# Patient Record
Sex: MALE | Race: WHITE | Employment: FULL TIME | URBAN - NONMETROPOLITAN AREA
[De-identification: names, ages, dates, MRNs, and addresses within clinical notes are randomized per-mention and may not be internally consistent; named-entity substitution may affect disease eponyms.]

---

## 2017-12-29 ENCOUNTER — HOSPITAL ENCOUNTER (OUTPATIENT)
Age: 62
Discharge: HOME | End: 2017-12-29
Payer: COMMERCIAL

## 2017-12-29 ENCOUNTER — HOSPITAL ENCOUNTER (OUTPATIENT)
Dept: GENERAL RADIOLOGY | Age: 62
Discharge: HOME | End: 2017-12-29
Payer: COMMERCIAL

## 2017-12-29 DIAGNOSIS — R52 PAIN: ICD-10-CM

## 2017-12-29 PROCEDURE — 73030 X-RAY EXAM OF SHOULDER: CPT

## 2018-03-10 ENCOUNTER — HOSPITAL ENCOUNTER (INPATIENT)
Age: 63
LOS: 3 days | Discharge: HOME | DRG: 311 | End: 2018-03-13
Attending: INTERNAL MEDICINE | Admitting: INTERNAL MEDICINE
Payer: COMMERCIAL

## 2018-03-10 ENCOUNTER — APPOINTMENT (OUTPATIENT)
Dept: CT IMAGING | Age: 63
End: 2018-03-10
Payer: COMMERCIAL

## 2018-03-10 ENCOUNTER — APPOINTMENT (OUTPATIENT)
Dept: GENERAL RADIOLOGY | Age: 63
End: 2018-03-10
Payer: COMMERCIAL

## 2018-03-10 ENCOUNTER — HOSPITAL ENCOUNTER (EMERGENCY)
Age: 63
Discharge: ANOTHER HOSPITAL | End: 2018-03-10
Attending: EMERGENCY MEDICINE
Payer: COMMERCIAL

## 2018-03-10 VITALS
OXYGEN SATURATION: 96 % | WEIGHT: 204 LBS | RESPIRATION RATE: 20 BRPM | HEART RATE: 102 BPM | DIASTOLIC BLOOD PRESSURE: 68 MMHG | SYSTOLIC BLOOD PRESSURE: 115 MMHG

## 2018-03-10 DIAGNOSIS — I24.9 ACUTE CORONARY SYNDROME (HCC): Primary | ICD-10-CM

## 2018-03-10 PROBLEM — E27.8 ADRENAL NODULE (HCC): Status: ACTIVE | Noted: 2018-03-10

## 2018-03-10 PROBLEM — E11.9 TYPE II DIABETES MELLITUS, WELL CONTROLLED (HCC): Status: ACTIVE | Noted: 2018-03-10

## 2018-03-10 PROBLEM — E04.1 THYROID NODULE: Status: ACTIVE | Noted: 2018-03-10

## 2018-03-10 PROBLEM — I10 ESSENTIAL HYPERTENSION: Status: ACTIVE | Noted: 2018-03-10

## 2018-03-10 PROBLEM — Z87.891 HISTORY OF TOBACCO ABUSE: Status: ACTIVE | Noted: 2018-03-10

## 2018-03-10 PROBLEM — I20.0 UNSTABLE ANGINA (HCC): Status: ACTIVE | Noted: 2018-03-10

## 2018-03-10 LAB
ABSOLUTE EOS #: 0.22 K/UL (ref 0–0.44)
ABSOLUTE IMMATURE GRANULOCYTE: 0.09 K/UL (ref 0–0.3)
ABSOLUTE LYMPH #: 2.7 K/UL (ref 1.1–3.7)
ABSOLUTE MONO #: 0.89 K/UL (ref 0.1–1.2)
ALBUMIN SERPL-MCNC: 4.1 G/DL (ref 3.5–5.2)
ALBUMIN SERPL-MCNC: 4.3 G/DL (ref 3.5–5.2)
ALBUMIN/GLOBULIN RATIO: 1.2 (ref 1–2.5)
ALBUMIN/GLOBULIN RATIO: 1.4 (ref 1–2.5)
ALP BLD-CCNC: 116 U/L (ref 40–129)
ALP BLD-CCNC: 116 U/L (ref 40–129)
ALT SERPL-CCNC: 21 U/L (ref 5–41)
ALT SERPL-CCNC: 24 U/L (ref 5–41)
ANION GAP SERPL CALCULATED.3IONS-SCNC: 20 MMOL/L (ref 9–17)
AST SERPL-CCNC: 17 U/L
AST SERPL-CCNC: 17 U/L
BASOPHILS # BLD: 1 % (ref 0–2)
BASOPHILS ABSOLUTE: 0.07 K/UL (ref 0–0.2)
BETA-HYDROXYBUTYRATE: 0.75 MMOL/L (ref 0.02–0.27)
BILIRUB SERPL-MCNC: 0.82 MG/DL (ref 0.3–1.2)
BILIRUB SERPL-MCNC: 0.86 MG/DL (ref 0.3–1.2)
BILIRUBIN DIRECT: 0.22 MG/DL
BILIRUBIN, INDIRECT: 0.6 MG/DL (ref 0–1)
BUN BLDV-MCNC: 16 MG/DL (ref 8–23)
BUN/CREAT BLD: 22 (ref 9–20)
CALCIUM SERPL-MCNC: 9.5 MG/DL (ref 8.6–10.4)
CHLORIDE BLD-SCNC: 91 MMOL/L (ref 98–107)
CK MB: 1.8 NG/ML
CO2: 22 MMOL/L (ref 20–31)
CREAT SERPL-MCNC: 0.74 MG/DL (ref 0.7–1.2)
DIFFERENTIAL TYPE: ABNORMAL
EKG ATRIAL RATE: 92 BPM
EKG ATRIAL RATE: 94 BPM
EKG P AXIS: 36 DEGREES
EKG P AXIS: 44 DEGREES
EKG P-R INTERVAL: 166 MS
EKG P-R INTERVAL: 172 MS
EKG Q-T INTERVAL: 366 MS
EKG Q-T INTERVAL: 382 MS
EKG QRS DURATION: 132 MS
EKG QRS DURATION: 134 MS
EKG QTC CALCULATION (BAZETT): 457 MS
EKG QTC CALCULATION (BAZETT): 472 MS
EKG R AXIS: -12 DEGREES
EKG R AXIS: -31 DEGREES
EKG T AXIS: -20 DEGREES
EKG T AXIS: 30 DEGREES
EKG VENTRICULAR RATE: 92 BPM
EKG VENTRICULAR RATE: 94 BPM
EOSINOPHILS RELATIVE PERCENT: 2 % (ref 1–4)
GFR AFRICAN AMERICAN: >60 ML/MIN
GFR NON-AFRICAN AMERICAN: >60 ML/MIN
GFR SERPL CREATININE-BSD FRML MDRD: ABNORMAL ML/MIN/{1.73_M2}
GFR SERPL CREATININE-BSD FRML MDRD: ABNORMAL ML/MIN/{1.73_M2}
GLOBULIN: NORMAL G/DL (ref 1.5–3.8)
GLUCOSE BLD-MCNC: 292 MG/DL (ref 70–99)
HCT VFR BLD CALC: 49.7 % (ref 40.7–50.3)
HEMOGLOBIN: 16.8 G/DL (ref 13–17)
IMMATURE GRANULOCYTES: 1 %
INR BLD: 1 (ref 0.9–1.2)
LACTIC ACID, WHOLE BLOOD: ABNORMAL MMOL/L (ref 0.7–2.1)
LACTIC ACID: 3.6 MMOL/L (ref 0.5–2.2)
LIPASE: 28 U/L (ref 13–60)
LYMPHOCYTES # BLD: 20 % (ref 24–43)
MCH RBC QN AUTO: 28.9 PG (ref 25.2–33.5)
MCHC RBC AUTO-ENTMCNC: 33.8 G/DL (ref 28.4–34.8)
MCV RBC AUTO: 85.5 FL (ref 82.6–102.9)
MONOCYTES # BLD: 7 % (ref 3–12)
NRBC AUTOMATED: 0 PER 100 WBC
PARTIAL THROMBOPLASTIN TIME: 25.5 SEC (ref 23.2–34.4)
PDW BLD-RTO: 12.2 % (ref 11.8–14.4)
PLATELET # BLD: 404 K/UL (ref 138–453)
PLATELET ESTIMATE: ABNORMAL
PMV BLD AUTO: 9.1 FL (ref 8.1–13.5)
POTASSIUM SERPL-SCNC: 4.3 MMOL/L (ref 3.7–5.3)
PROTHROMBIN TIME: 10.9 SEC (ref 9.7–12.2)
RBC # BLD: 5.81 M/UL (ref 4.21–5.77)
RBC # BLD: ABNORMAL 10*6/UL
SEG NEUTROPHILS: 71 % (ref 36–65)
SEGMENTED NEUTROPHILS ABSOLUTE COUNT: 9.46 K/UL (ref 1.5–8.1)
SODIUM BLD-SCNC: 133 MMOL/L (ref 135–144)
TOTAL PROTEIN: 7.4 G/DL (ref 6.4–8.3)
TOTAL PROTEIN: 7.5 G/DL (ref 6.4–8.3)
TROPONIN INTERP: NORMAL
TROPONIN T: <0.03 NG/ML
WBC # BLD: 13.4 K/UL (ref 3.5–11.3)
WBC # BLD: ABNORMAL 10*3/UL

## 2018-03-10 PROCEDURE — 99222 1ST HOSP IP/OBS MODERATE 55: CPT | Performed by: INTERNAL MEDICINE

## 2018-03-10 PROCEDURE — 96375 TX/PRO/DX INJ NEW DRUG ADDON: CPT

## 2018-03-10 PROCEDURE — 71045 X-RAY EXAM CHEST 1 VIEW: CPT

## 2018-03-10 PROCEDURE — 85610 PROTHROMBIN TIME: CPT

## 2018-03-10 PROCEDURE — 80053 COMPREHEN METABOLIC PANEL: CPT

## 2018-03-10 PROCEDURE — 99285 EMERGENCY DEPT VISIT HI MDM: CPT

## 2018-03-10 PROCEDURE — 6360000004 HC RX CONTRAST MEDICATION: Performed by: EMERGENCY MEDICINE

## 2018-03-10 PROCEDURE — 2580000003 HC RX 258: Performed by: EMERGENCY MEDICINE

## 2018-03-10 PROCEDURE — 84484 ASSAY OF TROPONIN QUANT: CPT

## 2018-03-10 PROCEDURE — 96374 THER/PROPH/DIAG INJ IV PUSH: CPT

## 2018-03-10 PROCEDURE — 71275 CT ANGIOGRAPHY CHEST: CPT

## 2018-03-10 PROCEDURE — 93005 ELECTROCARDIOGRAM TRACING: CPT

## 2018-03-10 PROCEDURE — 85025 COMPLETE CBC W/AUTO DIFF WBC: CPT

## 2018-03-10 PROCEDURE — 6360000002 HC RX W HCPCS: Performed by: EMERGENCY MEDICINE

## 2018-03-10 PROCEDURE — 2580000003 HC RX 258: Performed by: INTERNAL MEDICINE

## 2018-03-10 PROCEDURE — 6370000000 HC RX 637 (ALT 250 FOR IP): Performed by: EMERGENCY MEDICINE

## 2018-03-10 PROCEDURE — 36415 COLL VENOUS BLD VENIPUNCTURE: CPT

## 2018-03-10 PROCEDURE — 2060000000 HC ICU INTERMEDIATE R&B

## 2018-03-10 PROCEDURE — 74175 CTA ABDOMEN W/CONTRAST: CPT

## 2018-03-10 PROCEDURE — 83690 ASSAY OF LIPASE: CPT

## 2018-03-10 PROCEDURE — 82010 KETONE BODYS QUAN: CPT

## 2018-03-10 PROCEDURE — 6360000002 HC RX W HCPCS: Performed by: INTERNAL MEDICINE

## 2018-03-10 PROCEDURE — 6370000000 HC RX 637 (ALT 250 FOR IP): Performed by: INTERNAL MEDICINE

## 2018-03-10 PROCEDURE — 96372 THER/PROPH/DIAG INJ SC/IM: CPT

## 2018-03-10 PROCEDURE — 82553 CREATINE MB FRACTION: CPT

## 2018-03-10 PROCEDURE — 80076 HEPATIC FUNCTION PANEL: CPT

## 2018-03-10 PROCEDURE — 83605 ASSAY OF LACTIC ACID: CPT

## 2018-03-10 PROCEDURE — 85730 THROMBOPLASTIN TIME PARTIAL: CPT

## 2018-03-10 RX ORDER — TAMSULOSIN HYDROCHLORIDE 0.4 MG/1
0.4 CAPSULE ORAL DAILY
COMMUNITY

## 2018-03-10 RX ORDER — TAMSULOSIN HYDROCHLORIDE 0.4 MG/1
0.4 CAPSULE ORAL DAILY
Status: DISCONTINUED | OUTPATIENT
Start: 2018-03-10 | End: 2018-03-13 | Stop reason: HOSPADM

## 2018-03-10 RX ORDER — ASPIRIN 81 MG/1
324 TABLET, CHEWABLE ORAL ONCE
Status: DISCONTINUED | OUTPATIENT
Start: 2018-03-10 | End: 2018-03-10 | Stop reason: HOSPADM

## 2018-03-10 RX ORDER — SODIUM CHLORIDE 0.9 % (FLUSH) 0.9 %
10 SYRINGE (ML) INJECTION PRN
Status: DISCONTINUED | OUTPATIENT
Start: 2018-03-10 | End: 2018-03-13 | Stop reason: HOSPADM

## 2018-03-10 RX ORDER — GLIMEPIRIDE 2 MG/1
4 TABLET ORAL
Status: DISCONTINUED | OUTPATIENT
Start: 2018-03-11 | End: 2018-03-13 | Stop reason: HOSPADM

## 2018-03-10 RX ORDER — GLIMEPIRIDE 4 MG/1
4 TABLET ORAL
COMMUNITY

## 2018-03-10 RX ORDER — LISINOPRIL 10 MG/1
20 TABLET ORAL DAILY
COMMUNITY

## 2018-03-10 RX ORDER — NITROGLYCERIN 0.4 MG/1
0.4 TABLET SUBLINGUAL EVERY 5 MIN PRN
Status: DISCONTINUED | OUTPATIENT
Start: 2018-03-10 | End: 2018-03-10 | Stop reason: HOSPADM

## 2018-03-10 RX ORDER — ONDANSETRON 2 MG/ML
4 INJECTION INTRAMUSCULAR; INTRAVENOUS EVERY 6 HOURS PRN
Status: DISCONTINUED | OUTPATIENT
Start: 2018-03-10 | End: 2018-03-13 | Stop reason: HOSPADM

## 2018-03-10 RX ORDER — ACETAMINOPHEN 325 MG/1
650 TABLET ORAL EVERY 4 HOURS PRN
Status: DISCONTINUED | OUTPATIENT
Start: 2018-03-10 | End: 2018-03-13 | Stop reason: HOSPADM

## 2018-03-10 RX ORDER — FENTANYL CITRATE 50 UG/ML
50 INJECTION, SOLUTION INTRAMUSCULAR; INTRAVENOUS
Status: DISCONTINUED | OUTPATIENT
Start: 2018-03-10 | End: 2018-03-10 | Stop reason: HOSPADM

## 2018-03-10 RX ORDER — ASPIRIN 81 MG/1
81 TABLET, CHEWABLE ORAL DAILY
Status: DISCONTINUED | OUTPATIENT
Start: 2018-03-11 | End: 2018-03-13 | Stop reason: HOSPADM

## 2018-03-10 RX ORDER — 0.9 % SODIUM CHLORIDE 0.9 %
1000 INTRAVENOUS SOLUTION INTRAVENOUS ONCE
Status: COMPLETED | OUTPATIENT
Start: 2018-03-10 | End: 2018-03-10

## 2018-03-10 RX ORDER — SODIUM CHLORIDE 0.9 % (FLUSH) 0.9 %
10 SYRINGE (ML) INJECTION EVERY 12 HOURS SCHEDULED
Status: DISCONTINUED | OUTPATIENT
Start: 2018-03-10 | End: 2018-03-13 | Stop reason: HOSPADM

## 2018-03-10 RX ORDER — ESOMEPRAZOLE MAGNESIUM 20 MG/1
20 FOR SUSPENSION ORAL DAILY
COMMUNITY

## 2018-03-10 RX ORDER — ATORVASTATIN CALCIUM 20 MG/1
20 TABLET, FILM COATED ORAL NIGHTLY
Status: DISCONTINUED | OUTPATIENT
Start: 2018-03-10 | End: 2018-03-13 | Stop reason: HOSPADM

## 2018-03-10 RX ORDER — MORPHINE SULFATE 2 MG/ML
2 INJECTION, SOLUTION INTRAMUSCULAR; INTRAVENOUS
Status: DISCONTINUED | OUTPATIENT
Start: 2018-03-10 | End: 2018-03-13 | Stop reason: HOSPADM

## 2018-03-10 RX ORDER — ONDANSETRON 2 MG/ML
4 INJECTION INTRAMUSCULAR; INTRAVENOUS EVERY 30 MIN PRN
Status: DISCONTINUED | OUTPATIENT
Start: 2018-03-10 | End: 2018-03-10 | Stop reason: HOSPADM

## 2018-03-10 RX ORDER — HYDROCODONE BITARTRATE AND ACETAMINOPHEN 10; 325 MG/1; MG/1
1 TABLET ORAL EVERY 6 HOURS PRN
COMMUNITY

## 2018-03-10 RX ADMIN — ENOXAPARIN SODIUM 90 MG: 100 INJECTION SUBCUTANEOUS at 07:00

## 2018-03-10 RX ADMIN — Medication 10 ML: at 20:29

## 2018-03-10 RX ADMIN — IOPAMIDOL 100 ML: 612 INJECTION, SOLUTION INTRAVENOUS at 06:18

## 2018-03-10 RX ADMIN — MORPHINE SULFATE 2 MG: 2 INJECTION, SOLUTION INTRAMUSCULAR; INTRAVENOUS at 18:46

## 2018-03-10 RX ADMIN — ATORVASTATIN CALCIUM 20 MG: 20 TABLET, FILM COATED ORAL at 20:29

## 2018-03-10 RX ADMIN — ENOXAPARIN SODIUM 90 MG: 100 INJECTION SUBCUTANEOUS at 20:28

## 2018-03-10 RX ADMIN — ONDANSETRON 4 MG: 2 INJECTION INTRAMUSCULAR; INTRAVENOUS at 05:08

## 2018-03-10 RX ADMIN — FENTANYL CITRATE 50 MCG: 50 INJECTION, SOLUTION INTRAMUSCULAR; INTRAVENOUS at 05:09

## 2018-03-10 RX ADMIN — TAMSULOSIN HYDROCHLORIDE 0.4 MG: 0.4 CAPSULE ORAL at 16:04

## 2018-03-10 RX ADMIN — SODIUM CHLORIDE 1000 ML: 9 INJECTION, SOLUTION INTRAVENOUS at 05:16

## 2018-03-10 RX ADMIN — NITROGLYCERIN 0.4 MG: 0.4 TABLET SUBLINGUAL at 05:08

## 2018-03-10 ASSESSMENT — ENCOUNTER SYMPTOMS
NAUSEA: 1
FACIAL SWELLING: 0
COUGH: 0
SHORTNESS OF BREATH: 1
ABDOMINAL PAIN: 0
BACK PAIN: 0
VOMITING: 1
DIARRHEA: 0

## 2018-03-10 ASSESSMENT — PAIN SCALES - GENERAL
PAINLEVEL_OUTOF10: 3
PAINLEVEL_OUTOF10: 5
PAINLEVEL_OUTOF10: 2

## 2018-03-10 ASSESSMENT — PAIN DESCRIPTION - PAIN TYPE: TYPE: ACUTE PAIN

## 2018-03-10 ASSESSMENT — PAIN DESCRIPTION - LOCATION: LOCATION: CHEST

## 2018-03-10 ASSESSMENT — PAIN DESCRIPTION - PROGRESSION: CLINICAL_PROGRESSION: RESOLVED

## 2018-03-10 NOTE — ED PROVIDER NOTES
[facility] ED  eMERGENCY dEPARTMENT eNCOUnter      Pt Name: Nelida Cintron  MRN: [id number]  [place] 1955  Date of evaluation: 3/10/2018  Provider: Lety Moore MD    CHIEF COMPLAINT       Chief Complaint   Patient presents with    Chest Pain         HISTORY OF PRESENT ILLNESS   (Location/Symptom, Timing/Onset, Context/Setting, Quality, Duration, Modifying Factors, Severity)  Note limiting factors. Nelida Cintron is a 65 y.o. male who presents to the emergency department With chest tightness. Patient does have a history of hypertension, hyperlipidemia, non-insulin-dependent diabetes, and former smoking. Patient recently had a right rotator cuff repair done as an outpatient about 3 weeks ago. He states he recovered normally. He states that his sugars been running a little higher over the past week. Tonight, he woke with sudden onset substernal heaviness and dyspnea. He states that he's never had pain like this before. There is a significant family history of cardiac disease. The patient has never had heart catheterization or stress testing. He does not see a cardiologist.  He denies any fevers or chills. He states the pain comes in waves, it is a squeezing tightness. Does not go into his neck or his arm. He denies any history of pulmonary embolus. HPI    Nursing Notes were reviewed. REVIEW OF SYSTEMS    (2-9 systems for level 4, 10 or more for level 5)     Review of Systems   Constitutional: Negative for chills and fever. HENT: Negative for facial swelling and sneezing. Eyes: Negative for visual disturbance. Respiratory: Positive for shortness of breath. Negative for cough. Cardiovascular: Positive for chest pain. Gastrointestinal: Positive for nausea and vomiting. Negative for abdominal pain and diarrhea. Genitourinary: Negative for dysuria. Musculoskeletal: Negative for back pain. Skin: Negative for rash. Neurological: Negative for syncope and headaches. well-developed and well-nourished. He appears distressed. HENT:   Head: Normocephalic and atraumatic. Mouth/Throat: No oropharyngeal exudate. Eyes: Pupils are equal, round, and reactive to light. Neck: Normal range of motion. Neck supple. No JVD present. Cardiovascular: Normal rate, regular rhythm, normal heart sounds, intact distal pulses and normal pulses. No murmur heard. Pulmonary/Chest: Effort normal and breath sounds normal. No respiratory distress. Abdominal: Soft. Bowel sounds are normal. He exhibits no distension. Musculoskeletal: Normal range of motion. He exhibits no edema. Neurological: He is alert and oriented to person, place, and time. No cranial nerve deficit. Skin: Skin is warm. No abrasion and no rash noted. Nursing note and vitals reviewed. DIAGNOSTIC RESULTS     EKG: All EKG's are interpreted by the Emergency Department Physician who either signs or Co-signs this chart in the absence of a cardiologist.    Initial EKG shows sinus rhythm. Rate of 92. Left bundle branch block. Biphasic T waves in lead V1. Inferior Q waves. Repeat EKG with return of pain shows sinus rhythm. There is now biphasic T waves in leads V1 through V4. Left bundle branch block. No other acute progression. RADIOLOGY:   Non-plain film images such as CT, Ultrasound and MRI are read by the radiologist. Plain radiographic images are visualized and preliminarily interpreted by the emergency physician with the below findings:        Interpretation per the Radiologist below, if available at the time of this note:    CTA ABDOMEN W WO CONTRAST   Final Result   Impression:     There are no acute/traumatic findings in the chest, abdomen or pelvis. Images are duplicated in the CT chest angiogram order of the same date. Please see that examination for full details and description. Incidental right adrenal and right thyroid nodules warrant follow-up if not previously documented. 204 lb (92.5 kg)         The patient presents with chest heaviness that is severe and waxes and wanes. He has significant cardiac risk factors. He denies any history of prior MI or cardiac intervention. He did have a stress test about 12 years ago. His initial EKG immature to left bundle branch block. I have no old to compare to. There was a biphasic T-wave in just V1. The patient was given some nitro and fentanyl. He did completely take his pain away. His pain and return but was fleeting. I did repeat a second EKG. He had biphasic T waves in the anterior leads V1 through V4. Screening labs are obtained. Patient's cardiac enzymes are unremarkable. He does have a mild leukocytosis and elevated anion gap. I did obtain beta hydroxybutyrate, which is mildly elevated but I do feel this is likely because of his vomiting. LFTs are normal.  The patient's recent surgery, significant chest pain, and waxing and waning of symptoms I did send the patient for CTA of his chest and abdomen to rule out dissection. This was negative. The patient's lactate was also mildly elevated. I do feel that he does have some dehydration because he has not been taking his diabetes medications basically over the past week, has had increased urination and increased thirst.  I do not suspect infectious process. The patient was discussed with Dr. Ankit Simeon , cardiologist at [facility]. He requested Lovenox coverage given the patient's history. The patient was also discussed with the hospitalist.  He will be transferred for further cardiac workup. MDM    CRITICAL CARE TIME   Total Critical Care time was 35 minutes, excluding separately reportable procedures. There was a high probability of clinically significant/life threatening deterioration in the patient's condition which required my urgent intervention. CONSULTS:  None    PROCEDURES:  Unless otherwise noted below, none     Procedures    FINAL IMPRESSION      1.  Acute coronary

## 2018-03-10 NOTE — ED NOTES
CAlled Diana Jorge at  [facility]. Zan's unable to take report, he will call me back     Christine Castaneda RN  03/10/18 [id number]

## 2018-03-10 NOTE — H&P
REPORTED     Seg Neutrophils 71 (H) 36 - 65 %    Lymphocytes 20 (L) 24 - 43 %    Monocytes 7 3 - 12 %    Eosinophils % 2 1 - 4 %    Basophils 1 0 - 2 %    Immature Granulocytes 1 (H) 0 %    Segs Absolute 9.46 (H) 1.50 - 8.10 k/uL    Absolute Lymph # 2.70 1.10 - 3.70 k/uL    Absolute Mono # 0.89 0.10 - 1.20 k/uL    Absolute Eos # 0.22 0.00 - 0.44 k/uL    Basophils # 0.07 0.00 - 0.20 k/uL    Absolute Immature Granulocyte 0.09 0.00 - 0.30 k/uL   Comprehensive Metabolic Panel w/ Reflex to MG    Collection Time: 03/10/18  5:05 AM   Result Value Ref Range    Glucose 292 (H) 70 - 99 mg/dL    BUN 16 8 - 23 mg/dL    CREATININE 0.74 0.70 - 1.20 mg/dL    Bun/Cre Ratio 22 (H) 9 - 20    Calcium 9.5 8.6 - 10.4 mg/dL    Sodium 133 (L) 135 - 144 mmol/L    Potassium 4.3 3.7 - 5.3 mmol/L    Chloride 91 (L) 98 - 107 mmol/L    CO2 22 20 - 31 mmol/L    Anion Gap 20 (H) 9 - 17 mmol/L    Alkaline Phosphatase 116 40 - 129 U/L    ALT 21 5 - 41 U/L    AST 17 <40 U/L    Total Bilirubin 0.86 0.3 - 1.2 mg/dL    Total Protein 7.5 6.4 - 8.3 g/dL    Alb 4.1 3.5 - 5.2 g/dL    Albumin/Globulin Ratio 1.2 1.0 - 2.5    GFR Non-African American >60 >60 mL/min    GFR African American >60 >60 mL/min    GFR Comment          GFR Staging         Troponin    Collection Time: 03/10/18  5:05 AM   Result Value Ref Range    Troponin T <0.03 <0.03 ng/mL    Troponin Interp         CK MB    Collection Time: 03/10/18  5:05 AM   Result Value Ref Range    CK-MB 1.8 <10.5 ng/mL   APTT    Collection Time: 03/10/18  5:05 AM   Result Value Ref Range    PTT 25.5 23.2 - 34.4 sec   Protime-INR    Collection Time: 03/10/18  5:05 AM   Result Value Ref Range    Protime 10.9 9.7 - 12.2 sec    INR 1.0 0.9 - 1.2   Beta-Hydroxybuterate    Collection Time: 03/10/18  5:05 AM   Result Value Ref Range    Beta-Hydroxybutyrate 0.75 (H) 0.02 - 0.27 mmol/L   Hepatic function panel    Collection Time: 03/10/18  5:05 AM   Result Value Ref Range    Alb 4.3 3.5 - 5.2 g/dL    Alkaline Phosphatase 116 40 - 129 U/L    ALT 24 5 - 41 U/L    AST 17 <40 U/L    Total Bilirubin 0.82 0.3 - 1.2 mg/dL    Bilirubin, Direct 0.22 <0.31 mg/dL    Bilirubin, Indirect 0.60 0.00 - 1.00 mg/dL    Total Protein 7.4 6.4 - 8.3 g/dL    Globulin NOT REPORTED 1.5 - 3.8 g/dL    Albumin/Globulin Ratio 1.4 1.0 - 2.5   Lipase    Collection Time: 03/10/18  5:05 AM   Result Value Ref Range    Lipase 28 13 - 60 U/L   Lactic acid, plasma    Collection Time: 03/10/18  5:58 AM   Result Value Ref Range    Lactic Acid 3.6 (H) 0.5 - 2.2 mmol/L    Lactic Acid, Whole Blood NOT REPORTED 0.7 - 2.1 mmol/L   Troponin    Collection Time: 03/10/18 11:30 AM   Result Value Ref Range    Troponin T <0.03 <0.03 ng/mL    Troponin Interp         EKG 12 Lead    Collection Time: 03/10/18  2:37 PM   Result Value Ref Range    Ventricular Rate 94 BPM    Atrial Rate 94 BPM    P-R Interval 172 ms    QRS Duration 134 ms    Q-T Interval 366 ms    QTc Calculation (Bazett) 457 ms    P Axis 44 degrees    R Axis -12 degrees    T Axis -20 degrees     Assessment :      Principal Problem:    Unstable angina (HCC)  Active Problems:    Essential hypertension    Type II diabetes mellitus, well controlled ([facility])    History of tobacco abuse    Plan:     Principal Problem:    Unstable angina (HCC)\" Aspirin, Lovenox, Morphine. Pain control. Troponin follow up\    Essential hypertension, well controlled at this time    Type II diabetes mellitus, well controlled ([facility] [address]): Glimipiride continued along with sliding scale    History of tobacco abuse      Consultations:   [address]    Patient is admitted as inpatient status because of co-morbidities listed above, severity of signs and symptoms as outlined, requirement for current medical therapies and most importantly because of direct risk to patient if care not provided in a hospital setting.     Halle Kothari MD  3/10/2018  2:50 PM    Copy sent to Dr. Laly Ross

## 2018-03-11 ENCOUNTER — APPOINTMENT (OUTPATIENT)
Dept: ULTRASOUND IMAGING | Age: 63
DRG: 311 | End: 2018-03-11
Attending: INTERNAL MEDICINE
Payer: COMMERCIAL

## 2018-03-11 LAB
ANION GAP SERPL CALCULATED.3IONS-SCNC: 17 MMOL/L (ref 9–17)
BUN BLDV-MCNC: 14 MG/DL (ref 8–23)
BUN/CREAT BLD: ABNORMAL (ref 9–20)
CALCIUM SERPL-MCNC: 8.4 MG/DL (ref 8.6–10.4)
CHLORIDE BLD-SCNC: 95 MMOL/L (ref 98–107)
CHOLESTEROL/HDL RATIO: 5.7
CHOLESTEROL: 149 MG/DL
CO2: 21 MMOL/L (ref 20–31)
CREAT SERPL-MCNC: 0.54 MG/DL (ref 0.7–1.2)
DIRECT EXAM: NORMAL
EKG ATRIAL RATE: 83 BPM
EKG P AXIS: 49 DEGREES
EKG P-R INTERVAL: 162 MS
EKG Q-T INTERVAL: 416 MS
EKG QRS DURATION: 130 MS
EKG QTC CALCULATION (BAZETT): 488 MS
EKG R AXIS: -35 DEGREES
EKG T AXIS: 12 DEGREES
EKG VENTRICULAR RATE: 83 BPM
GFR AFRICAN AMERICAN: >60 ML/MIN
GFR NON-AFRICAN AMERICAN: >60 ML/MIN
GFR SERPL CREATININE-BSD FRML MDRD: ABNORMAL ML/MIN/{1.73_M2}
GFR SERPL CREATININE-BSD FRML MDRD: ABNORMAL ML/MIN/{1.73_M2}
GLUCOSE BLD-MCNC: 105 MG/DL (ref 75–110)
GLUCOSE BLD-MCNC: 129 MG/DL (ref 70–99)
GLUCOSE BLD-MCNC: 198 MG/DL (ref 75–110)
GLUCOSE BLD-MCNC: 81 MG/DL (ref 75–110)
HCT VFR BLD CALC: 42.2 % (ref 40.7–50.3)
HDLC SERPL-MCNC: 26 MG/DL
HEMOGLOBIN: 14.4 G/DL (ref 13–17)
LDL CHOLESTEROL: 101 MG/DL (ref 0–130)
Lab: NORMAL
MCH RBC QN AUTO: 29.3 PG (ref 25.2–33.5)
MCHC RBC AUTO-ENTMCNC: 34.1 G/DL (ref 28.4–34.8)
MCV RBC AUTO: 85.9 FL (ref 82.6–102.9)
NRBC AUTOMATED: 0 PER 100 WBC
PDW BLD-RTO: 12.3 % (ref 11.8–14.4)
PLATELET # BLD: 259 K/UL (ref 138–453)
PMV BLD AUTO: 9.6 FL (ref 8.1–13.5)
POTASSIUM SERPL-SCNC: 4.5 MMOL/L (ref 3.7–5.3)
RBC # BLD: 4.91 M/UL (ref 4.21–5.77)
SODIUM BLD-SCNC: 133 MMOL/L (ref 135–144)
SPECIMEN DESCRIPTION: NORMAL
STATUS: NORMAL
THYROXINE, FREE: 1.35 NG/DL (ref 0.93–1.7)
TRIGL SERPL-MCNC: 108 MG/DL
TROPONIN INTERP: NORMAL
TROPONIN T: <0.03 NG/ML
TSH SERPL DL<=0.05 MIU/L-ACNC: 0.27 MIU/L (ref 0.3–5)
VLDLC SERPL CALC-MCNC: ABNORMAL MG/DL (ref 1–30)
WBC # BLD: 8.4 K/UL (ref 3.5–11.3)

## 2018-03-11 PROCEDURE — 87804 INFLUENZA ASSAY W/OPTIC: CPT

## 2018-03-11 PROCEDURE — 84484 ASSAY OF TROPONIN QUANT: CPT

## 2018-03-11 PROCEDURE — 82947 ASSAY GLUCOSE BLOOD QUANT: CPT

## 2018-03-11 PROCEDURE — 84443 ASSAY THYROID STIM HORMONE: CPT

## 2018-03-11 PROCEDURE — 2580000003 HC RX 258: Performed by: INTERNAL MEDICINE

## 2018-03-11 PROCEDURE — 6360000002 HC RX W HCPCS: Performed by: INTERNAL MEDICINE

## 2018-03-11 PROCEDURE — 85027 COMPLETE CBC AUTOMATED: CPT

## 2018-03-11 PROCEDURE — 94762 N-INVAS EAR/PLS OXIMTRY CONT: CPT

## 2018-03-11 PROCEDURE — 6370000000 HC RX 637 (ALT 250 FOR IP): Performed by: INTERNAL MEDICINE

## 2018-03-11 PROCEDURE — 76705 ECHO EXAM OF ABDOMEN: CPT

## 2018-03-11 PROCEDURE — 2060000000 HC ICU INTERMEDIATE R&B

## 2018-03-11 PROCEDURE — 80061 LIPID PANEL: CPT

## 2018-03-11 PROCEDURE — 99232 SBSQ HOSP IP/OBS MODERATE 35: CPT | Performed by: INTERNAL MEDICINE

## 2018-03-11 PROCEDURE — 84439 ASSAY OF FREE THYROXINE: CPT

## 2018-03-11 PROCEDURE — 36415 COLL VENOUS BLD VENIPUNCTURE: CPT

## 2018-03-11 PROCEDURE — 80048 BASIC METABOLIC PNL TOTAL CA: CPT

## 2018-03-11 RX ORDER — IBUPROFEN 400 MG/1
400 TABLET ORAL EVERY 6 HOURS PRN
Status: DISCONTINUED | OUTPATIENT
Start: 2018-03-11 | End: 2018-03-13 | Stop reason: HOSPADM

## 2018-03-11 RX ADMIN — ATORVASTATIN CALCIUM 20 MG: 20 TABLET, FILM COATED ORAL at 23:46

## 2018-03-11 RX ADMIN — GLIMEPIRIDE 4 MG: 2 TABLET ORAL at 09:22

## 2018-03-11 RX ADMIN — ENOXAPARIN SODIUM 90 MG: 100 INJECTION SUBCUTANEOUS at 09:23

## 2018-03-11 RX ADMIN — ENOXAPARIN SODIUM 90 MG: 100 INJECTION SUBCUTANEOUS at 21:19

## 2018-03-11 RX ADMIN — Medication 10 ML: at 21:20

## 2018-03-11 RX ADMIN — IBUPROFEN 400 MG: 400 TABLET, FILM COATED ORAL at 16:40

## 2018-03-11 RX ADMIN — ASPIRIN 81 MG: 81 TABLET, CHEWABLE ORAL at 09:23

## 2018-03-11 ASSESSMENT — PAIN SCALES - GENERAL: PAINLEVEL_OUTOF10: 8

## 2018-03-11 NOTE — PLAN OF CARE
[facility]    Second Note  For more detailed information please refer to the progress note of the day      3/11/2018    2:44 PM    Name:   Krystyna Preciado  MRN:     [id number]     Juan Joseide:      [de-identified]   Room:   3015/3015-01   Day:  1  Admit Date:  3/10/2018  2:07 PM    PCP:   Candis Lundborg  Code Status:  Full Code    Pt vitals were reviewed   Continued to have no pain  Mild cough and fever, await influenza screen, if positive tamiflu, please call us  Stress for tomorrow      Renetta Gifford MD  3/11/2018  2:44 PM

## 2018-03-11 NOTE — CARE COORDINATION
Case Management Initial Discharge Plan  Keshia Andrews,         Readmission Risk              Readmission Risk:        3.25       Age 72 or Greater:  0    Admitted from SNF or Requires Paid or Family Care:  0    Currently has CHF,COPD,ARF,CRI,or is on dialysis:  0    Takes more than 5 Prescription Medications:  0    Takes Digoxin,Insulin,Anticoagulants,Narcotics or ASA/Plavix:  [address] in Past 12 Months:  0    On Disability:  0    Patient Considers own Health:  1.25            Met with:patient to discuss discharge plans.    Information verified: address, contacts, phone number, , insurance Yes  PCP: Elisa Morgan  Date of last visit: month ago     Insurance Provider: Cassy Buck    Discharge Planning  Current Residence:  Private Residence  Living Arrangements:  Spouse/Significant Other   Home has tow  stories/flight of  stairs to climb  Support Systems:  Spouse/Significant Other  Current Services PTA:  None Supplier: n/a  Patient able to perform ADL's:Independent  DME used to aid ambulation prior to admission: none/during admission, none     Potential Assistance Needed:  N/A    Pharmacy: [facility]    Potential Assistance Purchasing Medications:  No  Does patient want to participate in local refill/ meds to beds program?  No    Patient agreeable to home care: No  [place] of choice provided:  n/a      Type of Home Care Services:  None  Patient expects to be discharged to:  home    Prior SNF/Rehab Placement and Facility: n/a  Agreeable to SNF/Rehab: No  [place] of choice provided: n/a   Evaluation: no    Expected Discharge date:  18  Follow Up Appointment: Best Day/ Time:      Transportation provider: family   Transportation arrangements needed for discharge: No    Discharge Plan: home independently         Electronically signed by Domenico Sharpe RN on 3/11/18 at 3:27 PM

## 2018-03-11 NOTE — CONSULTS
Attestation signed by      Attending Physician Statement:    I have discussed the care of  Gera Bonilla , including pertinent history and exam findings, with the Cardiology fellow/resident. I have seen and examined the patient and the key elements of all parts of the encounter have been performed by me. I agree with the assessment, plan and orders as documented by the fellow/resident, after I modified exam findings and plan of treatments, and the final version is my approved version of the assessment. Additional Comments:     Plan for Lexiscan stress test tomorrow. Joel Grady MD       [facility] Cardiology Cardiology    Consult / H&P               Today's Date: 3/11/2018  Patient Name: Gera Bonilla  Date of admission: 3/10/2018  2:07 PM  Patient's age: 65 y.o., 1955  Admission Dx: Unstable angina ([address] [I20.0]    Reason for Consult:  Cardiac evaluation    Requesting Physician: Valentina Magaña MD    CHIEF COMPLAINT:  Chest heaviness    History Obtained From:  patient    HISTORY OF PRESENT ILLNESS:      The patient is a 65 y.o.  male who is admitted to the hospital for Chest Pain  Gera Bonilla complains of chest pain. Onset was 1 day ago. Symptoms have improved since that time. The patient's pain occurs at night, at rest, with ADLs and activities. The patient describes the pain as heaviness and does not radiate. Patient does not characterize as pain. Associated symptoms are: chest pressure/discomfort and exertional chest pressure/discomfort. Aggravating factors are: none. Alleviating factors are: none. Patient's cardiac risk factors are: diabetes mellitus, dyslipidemia, hypertension, male gender and smoking/ tobacco exposure. Patient's risk factors for DVT/PE: none. Previous cardiac testing: exercise stress test 3 years prior in [place] reports was negative. Patient transferred from [place] for chest pain to rule out ACS.   Patient reports ate [employer] last night around 8pm, upon reaching home vomited twice and went to bed, woke up later and vomited once more no nonbloody/nonbilious episodes. Awoke again at 4am with chest pain symptoms. Reports heaviness as if anchor was on chest, never before felt this way. nonradiating, would not say it is pain per se. No associated symptoms. Went to [place] [facility], CTA negative, trops negative, EKG showed LBBB with sinus tachy. Was given nitro in ED did not alleviate sx's. Has history of HTN, HLD, DM, quit smoking 24 years prior, smoked 1PPD for 10 years, no alcohol/drug use. No significant fmhx of coronary disease. Past Medical History:   has a past medical history of Diabetes mellitus ([facility] [address] and Hypertension. Past Surgical History:   has a past surgical history that includes shoulder surgery (Right, 02/21/2018). Home Medications:    Prior to Admission medications    Medication Sig Start Date End Date Taking? Authorizing Provider   lisinopril (PRINIVIL;ZESTRIL) 10 MG tablet Take 20 mg by mouth daily    Historical Provider, MD   esomeprazole Magnesium (NEXIUM) 20 MG PACK Take 20 mg by mouth daily    Historical Provider, MD   HYDROcodone-acetaminophen (NORCO)  MG per tablet Take 1 tablet by mouth every 6 hours as needed for Pain.     Historical Provider, MD   glimepiride (AMARYL) 4 MG tablet Take 4 mg by mouth every morning (before breakfast)    Historical Provider, MD   empagliflozin (JARDIANCE) 25 MG tablet Take 25 mg by mouth daily    Historical Provider, MD   tamsulosin (FLOMAX) 0.4 MG capsule Take 0.4 mg by mouth daily    Historical Provider, MD      Current Facility-Administered Medications: sodium chloride flush 0.9 % injection 10 mL, 10 mL, Intravenous, 2 times per day  sodium chloride flush 0.9 % injection 10 mL, 10 mL, Intravenous, PRN  acetaminophen (TYLENOL) tablet 650 mg, 650 mg, Oral, Q4H PRN  magnesium hydroxide (MILK OF MAGNESIA) 400 MG/5ML suspension 30 mL, 30 mL, Oral, Daily PRN  ondansetron (ZOFRAN) injection 4 mg, 4

## 2018-03-12 ENCOUNTER — APPOINTMENT (OUTPATIENT)
Dept: NUCLEAR MEDICINE | Age: 63
DRG: 311 | End: 2018-03-12
Attending: INTERNAL MEDICINE
Payer: COMMERCIAL

## 2018-03-12 LAB
GLUCOSE BLD-MCNC: 102 MG/DL (ref 75–110)
LV EF: 47 %
LV EF: 48 %
LVEF MODALITY: NORMAL
LVEF MODALITY: NORMAL

## 2018-03-12 PROCEDURE — 93017 CV STRESS TEST TRACING ONLY: CPT | Performed by: NURSE PRACTITIONER

## 2018-03-12 PROCEDURE — A9500 TC99M SESTAMIBI: HCPCS | Performed by: INTERNAL MEDICINE

## 2018-03-12 PROCEDURE — 3430000000 HC RX DIAGNOSTIC RADIOPHARMACEUTICAL: Performed by: INTERNAL MEDICINE

## 2018-03-12 PROCEDURE — 6370000000 HC RX 637 (ALT 250 FOR IP): Performed by: NURSE PRACTITIONER

## 2018-03-12 PROCEDURE — 82947 ASSAY GLUCOSE BLOOD QUANT: CPT

## 2018-03-12 PROCEDURE — 78452 HT MUSCLE IMAGE SPECT MULT: CPT

## 2018-03-12 PROCEDURE — 6370000000 HC RX 637 (ALT 250 FOR IP): Performed by: INTERNAL MEDICINE

## 2018-03-12 PROCEDURE — 6360000002 HC RX W HCPCS: Performed by: INTERNAL MEDICINE

## 2018-03-12 PROCEDURE — 99232 SBSQ HOSP IP/OBS MODERATE 35: CPT | Performed by: INTERNAL MEDICINE

## 2018-03-12 PROCEDURE — 2580000003 HC RX 258: Performed by: INTERNAL MEDICINE

## 2018-03-12 PROCEDURE — 93306 TTE W/DOPPLER COMPLETE: CPT

## 2018-03-12 PROCEDURE — 2060000000 HC ICU INTERMEDIATE R&B

## 2018-03-12 RX ORDER — AMINOPHYLLINE DIHYDRATE 25 MG/ML
100 INJECTION, SOLUTION INTRAVENOUS
Status: COMPLETED | OUTPATIENT
Start: 2018-03-12 | End: 2018-03-12

## 2018-03-12 RX ORDER — NITROGLYCERIN 0.4 MG/1
0.4 TABLET SUBLINGUAL EVERY 5 MIN PRN
Status: DISCONTINUED | OUTPATIENT
Start: 2018-03-12 | End: 2018-03-12

## 2018-03-12 RX ORDER — GUAIFENESIN DEXTROMETHORPHAN HYDROBROMIDE ORAL SOLUTION 10; 100 MG/5ML; MG/5ML
10 SOLUTION ORAL EVERY 4 HOURS PRN
Status: DISCONTINUED | OUTPATIENT
Start: 2018-03-12 | End: 2018-03-13 | Stop reason: HOSPADM

## 2018-03-12 RX ORDER — SODIUM CHLORIDE 0.9 % (FLUSH) 0.9 %
10 SYRINGE (ML) INJECTION PRN
Status: DISCONTINUED | OUTPATIENT
Start: 2018-03-12 | End: 2018-03-13 | Stop reason: HOSPADM

## 2018-03-12 RX ORDER — METOPROLOL TARTRATE 5 MG/5ML
2.5 INJECTION INTRAVENOUS PRN
Status: DISCONTINUED | OUTPATIENT
Start: 2018-03-12 | End: 2018-03-12

## 2018-03-12 RX ORDER — SODIUM CHLORIDE 0.9 % (FLUSH) 0.9 %
10 SYRINGE (ML) INJECTION PRN
Status: DISCONTINUED | OUTPATIENT
Start: 2018-03-12 | End: 2018-03-12

## 2018-03-12 RX ORDER — CETIRIZINE HYDROCHLORIDE 10 MG/1
10 TABLET ORAL DAILY
Status: DISCONTINUED | OUTPATIENT
Start: 2018-03-12 | End: 2018-03-13 | Stop reason: HOSPADM

## 2018-03-12 RX ORDER — SODIUM CHLORIDE 9 MG/ML
INJECTION, SOLUTION INTRAVENOUS ONCE
Status: COMPLETED | OUTPATIENT
Start: 2018-03-12 | End: 2018-03-12

## 2018-03-12 RX ADMIN — SODIUM CHLORIDE: 9 INJECTION, SOLUTION INTRAVENOUS at 10:24

## 2018-03-12 RX ADMIN — ATORVASTATIN CALCIUM 20 MG: 20 TABLET, FILM COATED ORAL at 21:38

## 2018-03-12 RX ADMIN — SODIUM CHLORIDE, PRESERVATIVE FREE 10 ML: 5 INJECTION INTRAVENOUS at 07:37

## 2018-03-12 RX ADMIN — IBUPROFEN 400 MG: 400 TABLET, FILM COATED ORAL at 12:40

## 2018-03-12 RX ADMIN — TETRAKIS(2-METHOXYISOBUTYLISOCYANIDE)COPPER(I) TETRAFLUOROBORATE 14.6 MILLICURIE: 1 INJECTION, POWDER, LYOPHILIZED, FOR SOLUTION INTRAVENOUS at 07:37

## 2018-03-12 RX ADMIN — ASPIRIN 81 MG: 81 TABLET, CHEWABLE ORAL at 08:44

## 2018-03-12 RX ADMIN — Medication 10 ML: at 21:39

## 2018-03-12 RX ADMIN — Medication 10 ML: at 14:38

## 2018-03-12 RX ADMIN — SODIUM CHLORIDE, PRESERVATIVE FREE 10 ML: 5 INJECTION INTRAVENOUS at 10:30

## 2018-03-12 RX ADMIN — Medication 10 ML: at 21:47

## 2018-03-12 RX ADMIN — ENOXAPARIN SODIUM 90 MG: 100 INJECTION SUBCUTANEOUS at 08:45

## 2018-03-12 RX ADMIN — REGADENOSON 0.4 MG: 0.08 INJECTION, SOLUTION INTRAVENOUS at 10:29

## 2018-03-12 RX ADMIN — TAMSULOSIN HYDROCHLORIDE 0.4 MG: 0.4 CAPSULE ORAL at 08:44

## 2018-03-12 RX ADMIN — TETRAKIS(2-METHOXYISOBUTYLISOCYANIDE)COPPER(I) TETRAFLUOROBORATE 33 MILLICURIE: 1 INJECTION, POWDER, LYOPHILIZED, FOR SOLUTION INTRAVENOUS at 10:30

## 2018-03-12 RX ADMIN — AMINOPHYLLINE 100 MG: 25 INJECTION, SOLUTION INTRAVENOUS at 10:32

## 2018-03-12 RX ADMIN — Medication 10 ML: at 10:22

## 2018-03-12 RX ADMIN — ENOXAPARIN SODIUM 90 MG: 100 INJECTION SUBCUTANEOUS at 21:39

## 2018-03-12 RX ADMIN — CETIRIZINE HYDROCHLORIDE 10 MG: 10 TABLET ORAL at 12:40

## 2018-03-12 ASSESSMENT — PAIN SCALES - GENERAL: PAINLEVEL_OUTOF10: 8

## 2018-03-12 NOTE — PLAN OF CARE
Problem: Pain:  Goal: Pain level will decrease  Pain level will decrease   Outcome: Ongoing  c/o right shoulder pain s/p surgery two weeks ago. Patient states level of pain at 8 on scale 0-10. Provided patient with ice pack and motrin as ordered. Patient stated relief. Continue to monitor patient for pain.

## 2018-03-12 NOTE — PLAN OF CARE
[facility]    Second Visit Note  For more detailed information please refer to the progress note of the day      3/12/2018    3:38 PM    Name:   Betty Miller  MRN:     [id number]     Joe:      [de-identified]   Room:   [facility]01   Day:  2  Admit Date:  3/10/2018  2:07 PM    PCP:   Ryan Avalos  Code Status:  Full Code        Pt vitals were reviewed   New labs were reviewed   Patient was seen    Updated plan :     1. No chest pain  2. Stress test suggestive of Intermediate risk.   3. We will wait for further cardiology plan        Milady Ibarra MD  3/12/2018  3:38 PM

## 2018-03-12 NOTE — PROCEDURES
[facility]                                CARDIAC STRESS TEST    PATIENT NAME: Darlin Rios                      :        1955  MED REC NO:   [id number]                             ROOM:       8769  ACCOUNT NO:   [de-identified]                           ADMIT DATE: 03/10/2018  PROVIDER:     Mode Soto    DATE OF STUDY:  2018    ORDERING PROVIDER:  Naima Saleem MD  PRIMARY CARE PROVIDER:  Monique Brennan MD  INTERPRETING PHYSICIAN:  MD Leah Kessler STRESS STUDY:  Indication:  chest pain  Procedure explained and consent signed    Medications:  Lexiscan, 0.4 mg; Aminophylline, 50 mg  Resting heart rate:  86  Resting blood pressure:  133/84  Infusion heart rate:  120  Infusion blood pressure:  134/84  Resting EKG:  Abnormal (Left bundle branch block)  Stress heart response:  Normal  Chest discomfort:  None  Ischemic EKG changes:  Uninterpretable    Comments: Nondiagnostic stress EKG due to left bundle branch block. IMPRESSION:  Electrocardiographically uninterpretable Lexiscan stress study. Radio-isotope results to follow from the department of Nuclear Medicine.             Sabrina Bernard MD    D: 2018 15:00:33       T: 2018 15:01:31     [place]/JÚNIOR  Job#: [id number]     Doc#: Unknown

## 2018-03-13 VITALS
TEMPERATURE: 97.8 F | HEART RATE: 108 BPM | BODY MASS INDEX: 29.12 KG/M2 | OXYGEN SATURATION: 98 % | WEIGHT: 208 LBS | RESPIRATION RATE: 16 BRPM | HEIGHT: 71 IN | SYSTOLIC BLOOD PRESSURE: 111 MMHG | DIASTOLIC BLOOD PRESSURE: 67 MMHG

## 2018-03-13 LAB
GLUCOSE BLD-MCNC: 133 MG/DL (ref 75–110)
GLUCOSE BLD-MCNC: 218 MG/DL (ref 75–110)

## 2018-03-13 PROCEDURE — 2580000003 HC RX 258: Performed by: INTERNAL MEDICINE

## 2018-03-13 PROCEDURE — 6370000000 HC RX 637 (ALT 250 FOR IP): Performed by: NURSE PRACTITIONER

## 2018-03-13 PROCEDURE — 6370000000 HC RX 637 (ALT 250 FOR IP): Performed by: INTERNAL MEDICINE

## 2018-03-13 PROCEDURE — 99232 SBSQ HOSP IP/OBS MODERATE 35: CPT | Performed by: INTERNAL MEDICINE

## 2018-03-13 PROCEDURE — 6360000002 HC RX W HCPCS: Performed by: INTERNAL MEDICINE

## 2018-03-13 PROCEDURE — 82947 ASSAY GLUCOSE BLOOD QUANT: CPT

## 2018-03-13 RX ORDER — ASPIRIN 81 MG/1
81 TABLET, CHEWABLE ORAL DAILY
Qty: 30 TABLET | Refills: 3 | Status: SHIPPED | OUTPATIENT
Start: 2018-03-14

## 2018-03-13 RX ADMIN — ENOXAPARIN SODIUM 90 MG: 100 INJECTION SUBCUTANEOUS at 09:51

## 2018-03-13 RX ADMIN — Medication 10 ML: at 09:56

## 2018-03-13 RX ADMIN — ASPIRIN 81 MG: 81 TABLET, CHEWABLE ORAL at 09:48

## 2018-03-13 RX ADMIN — GLIMEPIRIDE 4 MG: 2 TABLET ORAL at 09:49

## 2018-03-13 RX ADMIN — CETIRIZINE HYDROCHLORIDE 10 MG: 10 TABLET ORAL at 09:50

## 2018-03-13 RX ADMIN — Medication 10 ML: at 15:45

## 2018-03-13 RX ADMIN — TAMSULOSIN HYDROCHLORIDE 0.4 MG: 0.4 CAPSULE ORAL at 09:52

## 2018-03-13 RX ADMIN — Medication 10 ML: at 03:47

## 2018-03-13 NOTE — PROGRESS NOTES
Turner Barreto with [facility] was paged and notified of  stress test results, awaiting echo report
magnesium hydroxide, ondansetron, morphine    Data:     Past Medical History:   has a past medical history of Diabetes mellitus ([facility] and Hypertension. Social History:   reports that he has quit smoking. His smoking use included Cigarettes. He has never used smokeless tobacco. He reports that he drinks alcohol. He reports that he does not use drugs. Family History: No family history on file. Vitals:  /77   Pulse 93   Temp 99.4 °F (37.4 °C) (Oral)   Resp 20   Ht 5' 11\" (1.803 m)   Wt 204 lb 9.6 oz (92.8 kg)   SpO2 94%   BMI 28.54 kg/m²   Temp (24hrs), Av.6 °F (37.6 °C), Min:99.3 °F (37.4 °C), Max:100 °F (37.8 °C)    No results for input(s): POCGLU in the last 72 hours. I/O (24Hr): No intake or output data in the 24 hours ending 18 1103    Labs:    Physical Examination:        General appearance:  alert, cooperative and no distress  Mental Status:  oriented to person, place and time and normal affect  Lungs:  clear to auscultation bilaterally, normal effort  Heart:  regular rate and rhythm, no murmur  Abdomen:  soft, nontender, nondistended, normal bowel sounds, no masses, hepatomegaly, splenomegaly  Extremities:  no edema, redness, tenderness in the calves  Skin:  no gross lesions, rashes, induration    Assessment:        Primary Problem  Unstable angina [facility])    Active Hospital Problems    Diagnosis Date Noted    Unstable angina ([facility] [I20.0] 03/10/2018    Essential hypertension [I10] 03/10/2018    Type II diabetes mellitus, well controlled ([facility] [E11.9] 03/10/2018    History of tobacco abuse [Z87.891] 03/10/2018    Thyroid nodule [E04.1] 03/10/2018    Adrenal nodule ([facility] [E27.9] 03/10/2018     Plan:        Principal Problem:    Unstable angina [facility]): Likely cath vs stress in am. Await cardiology evaluation. Flu like illness; Influenza screen.      Essential hypertension, well controlled    Type II diabetes mellitus, well controlled ([address]    History of tobacco abuse
-intermediate risk   Physical Examination:        General appearance:  alert, cooperative and no distress  Mental Status:  oriented to person, place and time and normal affect  Lungs:  clear to auscultation bilaterally, normal effort  Heart: mild tachycardia,  regular  rhythm, no murmur  Abdomen:  soft, nontender, nondistended, normal bowel sounds, no masses, hepatomegaly, splenomegaly  Extremities:  no edema, redness, tenderness in the calves  Skin:  no gross lesions, rashes, induration    Assessment:        Primary Problem  Unstable angina (HCC)-no pain now  Stress test -intermediate risk   Active Hospital Problems    Diagnosis Date Noted    Unstable angina ([facility] [address] [I20.0] 03/10/2018    Essential hypertension [I10] 03/10/2018    Type II diabetes mellitus, well controlled ([address] [E11.9] 03/10/2018    History of tobacco abuse [Z87.891] 03/10/2018    Thyroid nodule [E04.1] 03/10/2018    Adrenal nodule ([address] [E27.9] 03/10/2018       Plan:        Waiting for echo result  Discharge home if cleared by cardiology after evaluating echo result and stress test result    Milady Ibarra MD  3/13/2018  8:43 AM

## 2018-03-13 NOTE — DISCHARGE SUMMARY
Blane Walker 19    Discharge Summary     Patient ID: Deanne Mcgregor  :  1955   MRN: [id number]     ACCOUNT:  [de-identified]   Patient's PCP: Josie Naranjo  Admit Date: 3/10/2018   Discharge Date: 3/13/2018     Length of Stay: 3  Code Status:  Full Code  Admitting Physician: Shantelle Davis MD  Discharge Physician: Shantelle Davis MD     Active Discharge Diagnoses:     Primary Problem  Unstable angina [facility])      [facility]    Diagnosis Date Noted    Unstable angina ([facility] [address] [I20.0] 03/10/2018    Essential hypertension [I10] 03/10/2018    Type II diabetes mellitus, well controlled ([facility] [address] [E11.9] 03/10/2018    History of tobacco abuse [Z87.891] 03/10/2018    Thyroid nodule [E04.1] 03/10/2018    Adrenal nodule ([facility] [address] [E27.9] 03/10/2018       Admission Condition:  fair     Discharged Condition: good    Hospital Stay:   Admitting history  Mr Mahnaz Martinez is a nice 65year old gentleman with history of diabetes, hypertension and history of tobacco abuse. He was admitted with chest pressure, pain, located in epigastric area and lower chest, initially started after dinner last night with associated nausea and vomiting. Improved after vomiting and then restarted back again at last night with more severe pain along with associated nausea and multiple episodes of vomiting. Went to [facility] ER, with extensive work up. EKG showed sinus tachycardia along with LBBB and some T wave changes. Negative troponin.  Chest pain improved after admission to ER with no recurrent pain since then    Hospital Course:   Remained chest pain-free in hospital  Stress test was suggestive of intermediate risk of ischemia  Cardiology has reviewed echo and stress test  No intervention is planned  Recommended discharge home with the same home medications  Aspirin has been added        Significant therapeutic interventions: Pain control    Significant Diagnostic Studies:   Labs / Micro:  CBC:   Lab Results   Component Value Date    WBC 8.4 03/11/2018    RBC 4.91 03/11/2018    HGB 14.4 03/11/2018    HCT 42.2 03/11/2018    MCV 85.9 03/11/2018    MCH 29.3 03/11/2018    MCHC 34.1 03/11/2018    RDW 12.3 03/11/2018     03/11/2018     BMP:    Lab Results   Component Value Date    GLUCOSE 129 03/11/2018     03/11/2018    K 4.5 03/11/2018    CL 95 03/11/2018    CO2 21 03/11/2018    ANIONGAP 17 03/11/2018    BUN 14 03/11/2018    CREATININE 0.54 03/11/2018    BUNCRER NOT REPORTED 03/11/2018    CALCIUM 8.4 03/11/2018    LABGLOM >60 03/11/2018    GFRAA >60 03/11/2018    GFR      03/11/2018    GFR NOT REPORTED 03/11/2018     HFP:    Lab Results   Component Value Date    PROT 7.5 03/10/2018    PROT 7.4 03/10/2018     CMP:    Lab Results   Component Value Date    GLUCOSE 129 03/11/2018     03/11/2018    K 4.5 03/11/2018    CL 95 03/11/2018    CO2 21 03/11/2018    BUN 14 03/11/2018    CREATININE 0.54 03/11/2018    ANIONGAP 17 03/11/2018    ALKPHOS 116 03/10/2018    ALKPHOS 116 03/10/2018    ALT 21 03/10/2018    ALT 24 03/10/2018    AST 17 03/10/2018    AST 17 03/10/2018    BILITOT 0.86 03/10/2018    BILITOT 0.82 03/10/2018    LABALBU 4.1 03/10/2018    LABALBU 4.3 03/10/2018    ALBUMIN 1.2 03/10/2018    ALBUMIN 1.4 03/10/2018    LABGLOM >60 03/11/2018    GFRAA >60 03/11/2018    GFR      03/11/2018    GFR NOT REPORTED 03/11/2018    PROT 7.5 03/10/2018    PROT 7.4 03/10/2018    CALCIUM 8.4 03/11/2018     PT/INR:  No results found for: PTINR  PTT:   Lab Results   Component Value Date    APTT 25.5 03/10/2018     FLP:    Lab Results   Component Value Date    CHOL 149 03/11/2018    TRIG 108 03/11/2018    HDL 26 03/11/2018     U/A:  No results found for: COLORU, TURBIDITY, SPECGRAV, HGBUR, PHUR, PROTEINU, GLUCOSEU, KETUA, BILIRUBINUR, UROBILINOGEN, NITRU, LEUKOCYTESUR  TSH:    Lab Results   Component Value Date    TSH 0.27 03/11/2018          Radiology:    Cta Abdomen W Wo Contrast    Result Date: 3/10/2018  FINAL REPORT EXAM:  CTA ABDOMEN W WO CONTRAST HISTORY:  Dissection TECHNIQUE:  CT images are acquired through the chest, abdomen and Pelvis in angiographic phase following intravenous administration of contrast. Transaxial, coronal and sagittal reformations with maximal intensity projections are provided. PRIORS:  Chest radiograph of the same date FINDINGS:  There are no acute/traumatic findings in the chest, abdomen or pelvis. Images are duplicated from CT chest angiogram order of the same date. Please see that examination for full details and description. Impression:  There are no acute/traumatic findings in the chest, abdomen or pelvis. Images are duplicated in the CT chest angiogram order of the same date. Please see that examination for full details and description. Incidental right adrenal and right thyroid nodules warrant follow-up if not previously documented. Electronically Signed By: Hernando Barrientos   on  03/10/2018  07:01    Us Gallbladder Ruq    Result Date: 3/11/2018  EXAMINATION: RIGHT UPPER QUADRANT ULTRASOUND 3/11/2018 8:21 am COMPARISON: None. HISTORY: ORDERING SYSTEM PROVIDED HISTORY: ABDOMINAL PAIN FINDINGS: LIVER: There is diffuse increased echogenicity throughout the visualized liver, with posterior acoustic attenuation, a nonspecific finding most commonly related to diffuse fatty infiltration. This finding limits evaluation for intrahepatic mass lesions; however, no intrahepatic mass lesion is identified. No intrahepatic biliary ductal dilation identified BILIARY SYSTEM:  7 mm hyperechogenic focus associated with a gallbladder wall, non dependently. Gallbladder is otherwise unremarkable without evidence of pericholecystic fluid, wall thickening or stones. Negative sonographic Bonilla's sign. Common bile duct is within normal limits measuring 3 mm. RIGHT KIDNEY: The right kidney is grossly unremarkable without evidence of hydronephrosis.  PANCREAS: Visualized portions of the pancreas are unremarkable. OTHER: No evidence of right upper quadrant ascites. 1. Gallbladder polyp measuring up to 7 mm. Follow-up ultrasound in 6 months is suggested to ensure stability. 2. No sonographic findings of acute cholecystitis. Xr Chest Portable    Result Date: 3/10/2018  FINAL REPORT EXAM:  XR CHEST PORTABLE HISTORY:  chest pain, surgery to right shoulder 1 week ago,  TECHNIQUE:  AP portable view(s) of the chest obtained. PRIORS:  None. FINDINGS:  No mediastinal shift. Cardiac silhouette is not enlarged. No pneumothorax, effusion, or focal pulmonary opacity identified. No acute skeletal findings. Impression:  No acute pulmonary finding identified. Electronically Signed By: Edwina Barraza   on  03/10/2018  05:44    Cta Chest W Contrast    Result Date: 3/10/2018  FINAL REPORT EXAM:  CTA CHEST W CONTRAST HISTORY:  AORTIC DZ, NON-TRAUMATIC, KNOWN OR SUSPECT TECHNIQUE:  CT imaging obtained through the chest, abdomen and pelvis in systemic angiographic phase following intravenous administration of contrast. Transaxial, Coronal and sagittal reformats with maximal intensity projections are provided. PRIORS:  Chest radiograph of the same date FINDINGS:  Chest: Normal caliber main pulmonary artery. No saddle/large central pulmonary embolism. No pericardial effusion. Cardiac size is within normal limits. Coronary artery disease is present. Thoracic aorta is normal in course and caliber. No periaortic fluid or stranding. No pneumothorax, effusion or focal airspace disease. The central airways are patent. No bronchiectasis. There is a 15 millimeter right thyroid hypo enhancing nodule. Abdomen/pelvis: The liver, gallbladder, pancreas and spleen are unremarkable. Mild thickening of the left adrenal gland without focal nodule. The right adrenal demonstrates an anterior 14 x 11 millimeter indeterminate nodule. Hollow enteric organs are normal in course and caliber.  Normal

## 2018-12-12 ENCOUNTER — HOSPITAL ENCOUNTER (OUTPATIENT)
Dept: PET IMAGING | Age: 63
Discharge: HOME | End: 2018-12-14
Payer: COMMERCIAL

## 2018-12-12 DIAGNOSIS — D49.2 BONE NEOPLASM: ICD-10-CM

## 2018-12-12 PROCEDURE — A9552 F18 FDG: HCPCS | Performed by: FAMILY MEDICINE

## 2018-12-12 PROCEDURE — 78815 PET IMAGE W/CT SKULL-THIGH: CPT

## 2018-12-12 PROCEDURE — 3430000000 HC RX DIAGNOSTIC RADIOPHARMACEUTICAL: Performed by: FAMILY MEDICINE

## 2018-12-12 RX ORDER — FLUDEOXYGLUCOSE F 18 200 MCI/ML
17.3 INJECTION, SOLUTION INTRAVENOUS
Status: COMPLETED | OUTPATIENT
Start: 2018-12-12 | End: 2018-12-12

## 2018-12-12 RX ADMIN — FLUDEOXYGLUCOSE F 18 17.3 MILLICURIE: 200 INJECTION, SOLUTION INTRAVENOUS at 08:30

## 2021-10-01 NOTE — PLAN OF CARE
Blane Walker 20    Second Visit Note  For more detailed information please refer to the progress note of the day      3/13/2018    4:34 PM    Name:   Faina Jasso  MRN:     [id number]     Juan Joseide:      [de-identified]   Room:   [facility]-01   Day:  3  Admit Date:  3/10/2018  2:07 PM    PCP:   Ashu Oakley  Code Status:  Full Code        Pt vitals were reviewed   New labs were reviewed   Patient was seen    Updated plan :     1. Patient is free of chest pain  2. Cardiology yet to evaluate echo and stress test  3.  Discharge plan if cardiology does not plan any intervention, discussed with the patient        Jaron Burgos MD  3/13/2018  4:34 PM 0